# Patient Record
Sex: MALE | Race: WHITE | Employment: FULL TIME | URBAN - METROPOLITAN AREA
[De-identification: names, ages, dates, MRNs, and addresses within clinical notes are randomized per-mention and may not be internally consistent; named-entity substitution may affect disease eponyms.]

---

## 2020-03-02 ENCOUNTER — ED HISTORICAL/CONVERTED ENCOUNTER (OUTPATIENT)
Dept: OTHER | Age: 25
End: 2020-03-02

## 2021-03-31 ENCOUNTER — OFFICE VISIT (OUTPATIENT)
Dept: INTERNAL MEDICINE CLINIC | Age: 26
End: 2021-03-31
Payer: COMMERCIAL

## 2021-03-31 VITALS
RESPIRATION RATE: 20 BRPM | BODY MASS INDEX: 20.28 KG/M2 | OXYGEN SATURATION: 99 % | SYSTOLIC BLOOD PRESSURE: 109 MMHG | HEIGHT: 67 IN | TEMPERATURE: 97.1 F | DIASTOLIC BLOOD PRESSURE: 69 MMHG | WEIGHT: 129.2 LBS | HEART RATE: 79 BPM

## 2021-03-31 DIAGNOSIS — F10.90 ALCOHOL INTAKE ABOVE RECOMMENDED SENSIBLE LIMITS: ICD-10-CM

## 2021-03-31 DIAGNOSIS — F17.210 CIGARETTE NICOTINE DEPENDENCE WITHOUT COMPLICATION: ICD-10-CM

## 2021-03-31 DIAGNOSIS — G89.29 CHRONIC LEFT UPPER QUADRANT PAIN: Primary | ICD-10-CM

## 2021-03-31 DIAGNOSIS — R10.12 CHRONIC LEFT UPPER QUADRANT PAIN: Primary | ICD-10-CM

## 2021-03-31 DIAGNOSIS — N20.0 BILATERAL RENAL STONES: ICD-10-CM

## 2021-03-31 PROCEDURE — 99204 OFFICE O/P NEW MOD 45 MIN: CPT | Performed by: INTERNAL MEDICINE

## 2021-03-31 NOTE — LETTER
NOTIFICATION RETURN TO WORK  
 
3/31/2021 3:29 PM 
 
Mr. Alysha Dubois [address] [place] [address] [phone] To Whom It May Concern: 
 
Alysha Dubois is currently under the care of [address] [place] [address]. He will return to work on April 1, 2021. If there are questions or concerns please have the patient contact our office. Sincerely, Hanh Riojas MD

## 2021-03-31 NOTE — PATIENT INSTRUCTIONS

## 2021-03-31 NOTE — LETTER
3/31/2021 3:27 PM 
 
Mr. Steve Llamas [address] [facility] [address] [place] [address] [phone] Darrold Rides was in an appointment with me today Sincerely, Yanique Ziegler MD

## 2021-03-31 NOTE — PROGRESS NOTES
1. Have you been to the ER, urgent care clinic since your last visit? Hospitalized since your last visit? Yes When: 3 weeks ago Where: [facility] Reason for visit: Pain in rib cage    2. Have you seen or consulted any other health care providers outside of the [address] [place] [address] since your last visit? Include any pap smears or colon screening. No     Chief Complaint   Patient presents with    New Patient    Other     pain in rib cage     Pt states that he went to [facility] for pain in his rib cage. Has not seen a provider in years.

## 2021-03-31 NOTE — PROGRESS NOTES
Jaylen Diaz is a 31 y.o. male and presents with New Patient and Other (pain in rib cage)    He says he has been havin gn pain in eh rights side of his rib cage for years, but, he was having more pain 2 weeks ago, he went to [facility], was told no fractures, was ggiven medications, he got better after a week, started improving, he says now is coming back on and off, when he bends over it hurts. He says the pain is also in the left upper quadrant, this has been there for many years and he's very concerned about it. No diarrhea or constipaation, no blood in the stools, no unexpected weight loss, eating well. He went to [facility]. Trying to quit smoking, he's vaping nicotine. Review of Systems  Review of Systems   Constitutional: Negative for chills, fatigue, fever and unexpected weight change. HENT: Negative for congestion, ear pain, sneezing and sore throat. Eyes: Negative for pain and discharge. Respiratory: Negative for cough, shortness of breath and wheezing. Cardiovascular: Negative for chest pain, palpitations and leg swelling. Gastrointestinal: Negative for abdominal pain, blood in stool, constipation and diarrhea. Endocrine: Negative for polydipsia and polyuria. Genitourinary: Negative for difficulty urinating, dysuria, frequency, hematuria and urgency. Musculoskeletal: Negative for arthralgias, back pain and joint swelling. Skin: Negative for rash. Allergic/Immunologic: Negative for environmental allergies and food allergies. Neurological: Negative for dizziness, speech difficulty, weakness, light-headedness, numbness and headaches. Hematological: Negative for adenopathy. Psychiatric/Behavioral: Negative for behavioral problems (Depression), sleep disturbance and suicidal ideas. History reviewed. No pertinent past medical history.   Past Surgical History:   Procedure Laterality Date    HX ORTHOPAEDIC      MA CARDIAC SURG PROCEDURE UNLIST Social History     Socioeconomic History    Marital status: UNKNOWN     Spouse name: Not on file    Number of children: Not on file    Years of education: Not on file    Highest education level: Not on file   Tobacco Use    Smoking status: Current Every Day Smoker     Packs/day: 0.25     Years: 8.00     Pack years: 2.00     Types: Cigarettes    Smokeless tobacco: Never Used   Substance and Sexual Activity    Alcohol use: Yes     Alcohol/week: 6.0 standard drinks     Types: 6 Cans of beer per week     Frequency: 4 or more times a week     Drinks per session: 1 or 2     Binge frequency: Weekly    Drug use: Never    Sexual activity: Yes     Partners: Female     Birth control/protection: None     Family History   Problem Relation Age of Onset    Hypertension Mother     Depression Mother     Hypertension Father        No Known Allergies    Objective:  Visit Vitals  /69 (BP 1 Location: Left upper arm, BP Patient Position: Sitting, BP Cuff Size: Adult)   Pulse 79   Temp 97.1 °F (36.2 °C) (Oral)   Resp 20   Ht 5' 7\" (1.702 m)   Wt 129 lb 3.2 oz (58.6 kg)   SpO2 99% Comment: RA   BMI 20.24 kg/m²     Physical Exam:   Physical Exam  Constitutional:       General: He is not in acute distress. Appearance: Normal appearance. HENT:      Head: Normocephalic and atraumatic. Mouth/Throat:      Mouth: Mucous membranes are moist.   Eyes:      Extraocular Movements: Extraocular movements intact. Conjunctiva/sclera: Conjunctivae normal.      Pupils: Pupils are equal, round, and reactive to light. Neck:      Musculoskeletal: Normal range of motion and neck supple. Cardiovascular:      Rate and Rhythm: Normal rate and regular rhythm. Pulses: Normal pulses. Heart sounds: Normal heart sounds. Pulmonary:      Effort: Pulmonary effort is normal.      Breath sounds: Normal breath sounds. Abdominal:      General: Abdomen is flat. Bowel sounds are normal. There is no distension. Palpations: Abdomen is soft. There is no mass. Tenderness: There is no abdominal tenderness (Left upper quadrant underneath rib cage). Musculoskeletal:         General: No swelling or deformity. Right lower leg: No edema. Left lower leg: No edema. Lymphadenopathy:      Cervical: No cervical adenopathy. Skin:     General: Skin is warm and dry. Capillary Refill: Capillary refill takes less than 2 seconds. Coloration: Skin is not jaundiced or pale. Findings: No erythema or rash. Neurological:      General: No focal deficit present. Mental Status: He is alert and oriented to person, place, and time. Psychiatric:         Mood and Affect: Mood normal.         Behavior: Behavior normal.         Thought Content: Thought content normal.         Judgment: Judgment normal.          No results found for this or any previous visit. Assessment/Plan:    He has been having the pain described intermittently and described as severe by him many times rest, he had done recently as per what  he tells me is left rib series normal at [facility], will get records. He has left upper quadrant tenderness, I see anything else abnormal in the physical exam, given this is a chronic problem and no apparent reason has been found I will order a CT. Counseled about smoking cessation and decreasing amount of alcohol intake. ICD-10-CM ICD-9-CM    1. Chronic left upper quadrant pain  R10.12 789.02 CT ABD WO CONT    G89.29 338.29    2. Cigarette nicotine dependence without complication  Z88.790 006.6    3. Alcohol intake above recommended sensible limits  Z72.89 305.00      Orders Placed This Encounter    CT ABD WO CONT     Standing Status:   Future     Standing Expiration Date:   4/30/2022     Scheduling Instructions:      Patient wants to be scheduled at [address] [place] [address]     Order Specific Question:   Type of contrast.  PLEASE NOTE: IV contrast is NOT utilized with this order.      Answer:   Oral increase physical activity, limit alcohol consumption, stop smoking (advice and handout given), call if any problems  Patient Instructions        Stopping Smoking: Care Instructions  Your Care Instructions     Cigarette smokers crave the nicotine in cigarettes. Giving it up is much harder than simply changing a habit. Your body has to stop craving the nicotine. It is hard to quit, but you can do it. There are many tools that people use to quit smoking. You may find that combining tools works best for you. There are several steps to quitting. First you get ready to quit. Then you get support to help you. After that, you learn new skills and behaviors to become a nonsmoker. For many people, a necessary step is getting and using medicine. Your doctor will help you set up the plan that best meets your needs. You may want to attend a smoking cessation program to help you quit smoking. When you choose a program, look for one that has proven success. Ask your doctor for ideas. You will greatly increase your chances of success if you take medicine as well as get counseling or join a cessation program.  Some of the changes you feel when you first quit tobacco are uncomfortable. Your body will miss the nicotine at first, and you may feel short-tempered and grumpy. You may have trouble sleeping or concentrating. Medicine can help you deal with these symptoms. You may struggle with changing your smoking habits and rituals. The last step is the tricky one: Be prepared for the smoking urge to continue for a time. This is a lot to deal with, but keep at it. You will feel better. Follow-up care is a key part of your treatment and safety. Be sure to make and go to all appointments, and call your doctor if you are having problems. It's also a good idea to know your test results and keep a list of the medicines you take. How can you care for yourself at home? · Ask your family, friends, and coworkers for support.  You have a better chance of quitting if you have help and support. · Join a support group, such as Nicotine Anonymous, for people who are trying to quit smoking. · Consider signing up for a smoking cessation program, such as the American Lung Association's Freedom from Smoking program.  · Get text messaging support. Go to the website at www.smokefree. gov to sign up for the [facility] program.  · Set a quit date. Pick your date carefully so that it is not right in the middle of a big deadline or stressful time. Once you quit, do not even take a puff. Get rid of all ashtrays and lighters after your last cigarette. Clean your house and your clothes so that they do not smell of smoke. · Learn how to be a nonsmoker. Think about ways you can avoid those things that make you reach for a cigarette. ? Avoid situations that put you at greatest risk for smoking. For some people, it is hard to have a drink with friends without smoking. For others, they might skip a coffee break with coworkers who smoke. ? Change your daily routine. Take a different route to work or eat a meal in a different place. · Cut down on stress. Calm yourself or release tension by doing an activity you enjoy, such as reading a book, taking a hot bath, or gardening. · Talk to your doctor or pharmacist about nicotine replacement therapy, which replaces the nicotine in your body. You still get nicotine but you do not use tobacco. Nicotine replacement products help you slowly reduce the amount of nicotine you need. These products come in several forms, many of them available over-the-counter:  ? Nicotine patches  ? Nicotine gum and lozenges  ? Nicotine inhaler  · Ask your doctor about bupropion (Wellbutrin) or varenicline (Chantix), which are prescription medicines. They do not contain nicotine. They help you by reducing withdrawal symptoms, such as stress and anxiety.   · Some people find hypnosis, acupuncture, and massage helpful for ending the smoking habit.  · Eat a healthy diet and get regular exercise. Having healthy habits will help your body move past its craving for nicotine. · Be prepared to keep trying. Most people are not successful the first few times they try to quit. Do not get mad at yourself if you smoke again. Make a list of things you learned and think about when you want to try again, such as next week, next month, or next year. Where can you learn more? Go to http://www.gray.com/  Enter [id number] in the search box to learn more about \"Stopping Smoking: Care Instructions. \"  Current as of: March 12, 2020               Content Version: 12.6  © 0165-1489 [employer]. Care instructions adapted under license by [employer] (which disclaims liability or warranty for this information). If you have questions about a medical condition or this instruction, always ask your healthcare professional. [place] [address] any warranty or liability for your use of this information. Follow-up and Dispositions    · Return in about 2 months (around 5/31/2021).

## 2021-04-09 ENCOUNTER — HOSPITAL ENCOUNTER (OUTPATIENT)
Dept: CT IMAGING | Age: 26
Discharge: HOME | End: 2021-04-09
Payer: COMMERCIAL

## 2021-04-09 DIAGNOSIS — G89.29 CHRONIC LEFT UPPER QUADRANT PAIN: ICD-10-CM

## 2021-04-09 DIAGNOSIS — R10.12 CHRONIC LEFT UPPER QUADRANT PAIN: ICD-10-CM

## 2021-04-09 PROCEDURE — 74150 CT ABDOMEN W/O CONTRAST: CPT

## 2021-04-14 NOTE — PROGRESS NOTES
Shahriar can you please email him the Urology referral I am placing? Thanks. I sent him the following msg to his portal:  Nagi Peraza, you have a right small kidney stone and a left one as well, they are not causing obstruction which is a good thing and are very very small, only 2 mm. I am not sure this is the actual reason for your pain but it could be. Since they are proximal it's unlikely that they will be eliminated spontaneously, so I am going to refer you to Urology to see if we can manage medically or if you need any intervention. You should be able to see the referral in your portal, just in case we will email it to you as well. Thank you.

## 2021-04-16 ENCOUNTER — TELEPHONE (OUTPATIENT)
Dept: INTERNAL MEDICINE CLINIC | Age: 26
End: 2021-04-16

## 2021-04-16 NOTE — TELEPHONE ENCOUNTER
Patient called and left message, received results of CT on portal and wanted to discuss. Called patient, no answer. Voicemail box full, unable to leave message.

## 2021-05-04 ENCOUNTER — TELEPHONE (OUTPATIENT)
Dept: UROLOGY | Age: 26
End: 2021-05-04

## 2021-05-04 NOTE — TELEPHONE ENCOUNTER
JOHN for patients asking to call the office in reference to his previous message that he left about making an appointment for kidney stones and he was referred by his  PCP  Dr. Nuno Lujan

## 2021-06-14 ENCOUNTER — OFFICE VISIT (OUTPATIENT)
Dept: INTERNAL MEDICINE CLINIC | Age: 26
End: 2021-06-14
Payer: COMMERCIAL

## 2021-06-14 VITALS
BODY MASS INDEX: 20.09 KG/M2 | OXYGEN SATURATION: 97 % | SYSTOLIC BLOOD PRESSURE: 105 MMHG | TEMPERATURE: 98.5 F | DIASTOLIC BLOOD PRESSURE: 67 MMHG | HEIGHT: 67 IN | HEART RATE: 79 BPM | WEIGHT: 128 LBS | RESPIRATION RATE: 16 BRPM

## 2021-06-14 DIAGNOSIS — R10.12 CHRONIC LEFT UPPER QUADRANT PAIN: ICD-10-CM

## 2021-06-14 DIAGNOSIS — R10.9 CHRONIC LEFT FLANK PAIN: ICD-10-CM

## 2021-06-14 DIAGNOSIS — G89.29 CHRONIC LEFT FLANK PAIN: ICD-10-CM

## 2021-06-14 DIAGNOSIS — G89.29 CHRONIC LEFT UPPER QUADRANT PAIN: ICD-10-CM

## 2021-06-14 DIAGNOSIS — Z11.59 NEED FOR HEPATITIS C SCREENING TEST: ICD-10-CM

## 2021-06-14 DIAGNOSIS — N20.0 BILATERAL RENAL STONES: Primary | ICD-10-CM

## 2021-06-14 PROCEDURE — 99214 OFFICE O/P EST MOD 30 MIN: CPT | Performed by: INTERNAL MEDICINE

## 2021-06-14 RX ORDER — NAPROXEN 375 MG/1
375 TABLET ORAL
Qty: 30 TABLET | Refills: 0 | Status: SHIPPED | OUTPATIENT
Start: 2021-06-14

## 2021-06-14 NOTE — PROGRESS NOTES
Chief Complaint   Patient presents with    Follow-up    Kidney Stone     Pt states he has an appt in August to see urologist. Reports pain comes and goes, mainly in the morning. Visit Vitals  /67 (BP 1 Location: Left arm, BP Patient Position: Sitting)   Pulse 79   Temp 98.5 °F (36.9 °C) (Oral)   Resp 16   Ht 5' 7\" (1.702 m)   Wt 128 lb (58.1 kg)   SpO2 97%   BMI 20.05 kg/m²     1. Have you been to the ER, urgent care clinic since your last visit? Hospitalized since your last visit? No    2. Have you seen or consulted any other health care providers outside of the [address] [place] [address] since your last visit? Include any pap smears or colon screening.  No

## 2021-06-14 NOTE — PROGRESS NOTES
Gaetano Thurman is a 31 y.o. male and presents with Follow-up and Kidney Stone (Pt states he has an appt in August to see urologist. Reports pain comes and goes, mainly in the morning. )    Still having intermittent left upper quadrant pain and left flank pain, some days is mild, some other days is really bad around 9-10/10, some days he does not have pain. Pain is sharp, colicky, it stays for few minutes. He does not try to take anything when it's bad because he knows it will go away on it's own. CT abdomen done in April as mentioned before shows 2 kidney stones 1 in the right and one in the left both of 2 mm. The one in the left is in the lower pole of the kidney    Review of Systems  Review of Systems   Constitutional: Negative for chills, fatigue, fever and unexpected weight change. HENT: Negative for congestion, ear pain, sneezing and sore throat. Eyes: Negative for pain and discharge. Respiratory: Negative for cough, shortness of breath and wheezing. Cardiovascular: Negative for chest pain, palpitations and leg swelling. Gastrointestinal: Positive for abdominal pain. Negative for blood in stool, constipation and diarrhea. Endocrine: Negative for polydipsia and polyuria. Genitourinary: Negative for difficulty urinating, dysuria, frequency, hematuria and urgency. Musculoskeletal: Negative for arthralgias, back pain and joint swelling. Skin: Negative for rash. Allergic/Immunologic: Negative for environmental allergies and food allergies. Neurological: Negative for dizziness, speech difficulty, weakness, light-headedness, numbness and headaches. Hematological: Negative for adenopathy. Psychiatric/Behavioral: Negative for behavioral problems (Depression), sleep disturbance and suicidal ideas. History reviewed. No pertinent past medical history.   Past Surgical History:   Procedure Laterality Date    HX ORTHOPAEDIC      [place] CARDIAC SURG PROCEDURE UNLIST       Social History Socioeconomic History    Marital status: SINGLE     Spouse name: Not on file    Number of children: Not on file    Years of education: Not on file    Highest education level: Not on file   Tobacco Use    Smoking status: Former Smoker     Packs/day: 0.25     Years: 8.00     Pack years: 2.00     Types: Cigarettes     Quit date: 2021     Years since quittin.0    Smokeless tobacco: Never Used   Vaping Use    Vaping Use: Every day    Substances: Nicotine, Flavoring    Devices: Refillable tank   Substance and Sexual Activity    Alcohol use: Yes     Alcohol/week: 6.0 standard drinks     Types: 6 Cans of beer per week    Drug use: Never    Sexual activity: Yes     Partners: Female     Birth control/protection: None     Social Determinants of Health     Financial Resource Strain:     Difficulty of Paying Living Expenses:    Food Insecurity:     Worried About Running Out of Food in the Last Year:     [address] in the Last Year:    Transportation Needs:     Lack of Transportation (Medical):      Lack of Transportation (Non-Medical):    Physical Activity:     Days of Exercise per Week:     Minutes of Exercise per Session:    Stress:     Feeling of Stress :    Social Connections:     Frequency of Communication with Friends and Family:     Frequency of Social Gatherings with Friends and Family:     Attends [employer] Services:     Active Member of Clubs or Organizations:     Attends Club or Organization Meetings:     Marital Status:      Family History   Problem Relation Age of Onset    Hypertension Mother     Depression Mother     Hypertension Father        No Known Allergies    Objective:  Visit Vitals  /67 (BP 1 Location: Left arm, BP Patient Position: Sitting)   Pulse 79   Temp 98.5 °F (36.9 °C) (Oral)   Resp 16   Ht 5' 7\" (1.702 m)   Wt 128 lb (58.1 kg)   SpO2 97%   BMI 20.05 kg/m²     Physical Exam:   Physical Exam  Constitutional:       General: He is not in acute distress. Appearance: Normal appearance. HENT:      Head: Normocephalic and atraumatic. Mouth/Throat:      Mouth: Mucous membranes are moist.   Eyes:      Extraocular Movements: Extraocular movements intact. Conjunctiva/sclera: Conjunctivae normal.      Pupils: Pupils are equal, round, and reactive to light. Cardiovascular:      Rate and Rhythm: Normal rate and regular rhythm. Pulses: Normal pulses. Heart sounds: Normal heart sounds. Pulmonary:      Effort: Pulmonary effort is normal.      Breath sounds: Normal breath sounds. Abdominal:      General: Abdomen is flat. Bowel sounds are normal. There is no distension. Palpations: Abdomen is soft. There is no mass. Tenderness: There is no abdominal tenderness. Musculoskeletal:         General: No swelling or deformity. Cervical back: Normal range of motion and neck supple. Right lower leg: No edema. Left lower leg: No edema. Lymphadenopathy:      Cervical: No cervical adenopathy. Skin:     General: Skin is warm and dry. Capillary Refill: Capillary refill takes less than 2 seconds. Coloration: Skin is not jaundiced or pale. Findings: No erythema or rash. Neurological:      General: No focal deficit present. Mental Status: He is alert and oriented to person, place, and time. Psychiatric:         Mood and Affect: Mood normal.         Behavior: Behavior normal.         Thought Content: Thought content normal.         Judgment: Judgment normal.          No results found for this or any previous visit. Assessment/Plan:    I do not see any other apparent reason for the pain he is having asides from the small kidney stone in the left lower pole, he has appointment scheduled with urology Dr. Puja Dutton in August, [facility] maybe can be [facility].   Can take NSAIDs as needed for pain      ICD-10-CM ICD-9-CM    1. Bilateral renal stones  N20.0 592.0 REFERRAL TO UROLOGY      naproxen (NAPROSYN) 375 mg tablet      CBC WITH AUTOMATED DIFF      METABOLIC PANEL, COMPREHENSIVE   2. Chronic left flank pain  R10.9 789.09 REFERRAL TO UROLOGY    G89.29 338.29 naproxen (NAPROSYN) 375 mg tablet      CBC WITH AUTOMATED DIFF      METABOLIC PANEL, COMPREHENSIVE   3. Chronic left upper quadrant pain  R10.12 789.02 REFERRAL TO UROLOGY    G89.29 338.29 naproxen (NAPROSYN) 375 mg tablet      CBC WITH AUTOMATED DIFF      METABOLIC PANEL, COMPREHENSIVE   4. Need for hepatitis C screening test  Z11.59 V73.89 HEPATITIS C AB     Orders Placed This Encounter    CBC WITH AUTOMATED DIFF    METABOLIC PANEL, COMPREHENSIVE    HEPATITIS C AB    REFERRAL TO UROLOGY     Referral Priority:   Routine     Referral Type:   Consultation     Referral Reason:   Specialty Services Required     Referred to Provider:   Mata Jon MD     Number of Visits Requested:   1    naproxen (NAPROSYN) 375 mg tablet     Sig: Take 1 Tablet by mouth two (2) times daily as needed for Pain. Dispense:  30 Tablet     Refill:  0     call if any problems  There are no Patient Instructions on file for this visit. Follow-up and Dispositions    · Return in about 3 months (around 9/14/2021).

## 2021-06-14 NOTE — LETTER
6/14/2021 4:41 PM 
 
Mr. Ailyn Rajan [address] [facility] [address] [place] [address] [phone] To whom it may concern, 
 
Jg Willis was at my office for his 4 pm appointment today. Sincerely, Samuel Duron MD

## 2021-06-26 LAB
ALBUMIN SERPL-MCNC: 4.8 G/DL (ref 4.1–5.2)
ALBUMIN/GLOB SERPL: 1.9 {RATIO} (ref 1.2–2.2)
ALP SERPL-CCNC: 52 IU/L (ref 48–121)
ALT SERPL-CCNC: 8 IU/L (ref 0–44)
AST SERPL-CCNC: 13 IU/L (ref 0–40)
BASOPHILS # BLD AUTO: 0 X10E3/UL (ref 0–0.2)
BASOPHILS NFR BLD AUTO: 1 %
BILIRUB SERPL-MCNC: 1.3 MG/DL (ref 0–1.2)
BUN SERPL-MCNC: 18 MG/DL (ref 6–20)
BUN/CREAT SERPL: 17 (ref 9–20)
CALCIUM SERPL-MCNC: 9.9 MG/DL (ref 8.7–10.2)
CHLORIDE SERPL-SCNC: 106 MMOL/L (ref 96–106)
CO2 SERPL-SCNC: 25 MMOL/L (ref 20–29)
CREAT SERPL-MCNC: 1.05 MG/DL (ref 0.76–1.27)
EOSINOPHIL # BLD AUTO: 0.1 X10E3/UL (ref 0–0.4)
EOSINOPHIL NFR BLD AUTO: 2 %
ERYTHROCYTE [DISTWIDTH] IN BLOOD BY AUTOMATED COUNT: 12.4 % (ref 11.6–15.4)
GLOBULIN SER CALC-MCNC: 2.5 G/DL (ref 1.5–4.5)
GLUCOSE SERPL-MCNC: 101 MG/DL (ref 65–99)
HCT VFR BLD AUTO: 45.3 % (ref 37.5–51)
HCV AB S/CO SERPL IA: <0.1 S/CO RATIO (ref 0–0.9)
HGB BLD-MCNC: 15.2 G/DL (ref 13–17.7)
IMM GRANULOCYTES # BLD AUTO: 0 X10E3/UL (ref 0–0.1)
IMM GRANULOCYTES NFR BLD AUTO: 0 %
LYMPHOCYTES # BLD AUTO: 2 X10E3/UL (ref 0.7–3.1)
LYMPHOCYTES NFR BLD AUTO: 36 %
MCH RBC QN AUTO: 30 PG (ref 26.6–33)
MCHC RBC AUTO-ENTMCNC: 33.6 G/DL (ref 31.5–35.7)
MCV RBC AUTO: 89 FL (ref 79–97)
MONOCYTES # BLD AUTO: 0.4 X10E3/UL (ref 0.1–0.9)
MONOCYTES NFR BLD AUTO: 7 %
NEUTROPHILS # BLD AUTO: 2.9 X10E3/UL (ref 1.4–7)
NEUTROPHILS NFR BLD AUTO: 54 %
PLATELET # BLD AUTO: 304 X10E3/UL (ref 150–450)
POTASSIUM SERPL-SCNC: 4.7 MMOL/L (ref 3.5–5.2)
PROT SERPL-MCNC: 7.3 G/DL (ref 6–8.5)
RBC # BLD AUTO: 5.07 X10E6/UL (ref 4.14–5.8)
SODIUM SERPL-SCNC: 144 MMOL/L (ref 134–144)
WBC # BLD AUTO: 5.4 X10E3/UL (ref 3.4–10.8)

## 2021-08-12 PROBLEM — N20.0 KIDNEY STONE: Status: ACTIVE | Noted: 2021-08-12

## 2021-08-13 ENCOUNTER — OFFICE VISIT (OUTPATIENT)
Dept: UROLOGY | Age: 26
End: 2021-08-13
Payer: COMMERCIAL

## 2021-08-13 VITALS
HEIGHT: 67 IN | TEMPERATURE: 97.1 F | BODY MASS INDEX: 19.62 KG/M2 | OXYGEN SATURATION: 98 % | SYSTOLIC BLOOD PRESSURE: 105 MMHG | DIASTOLIC BLOOD PRESSURE: 67 MMHG | HEART RATE: 78 BPM | WEIGHT: 125 LBS | RESPIRATION RATE: 22 BRPM

## 2021-08-13 DIAGNOSIS — N20.0 KIDNEY STONE: Primary | ICD-10-CM

## 2021-08-13 DIAGNOSIS — R10.9 FLANK PAIN: ICD-10-CM

## 2021-08-13 LAB
BILIRUB UR QL STRIP: NEGATIVE
GLUCOSE UR-MCNC: NEGATIVE MG/DL
KETONES P FAST UR STRIP-MCNC: NEGATIVE MG/DL
PH UR STRIP: 5 [PH] (ref 4.6–8)
PROT UR QL STRIP: NEGATIVE
SP GR UR STRIP: 1.03 (ref 1–1.03)
UA UROBILINOGEN AMB POC: NORMAL (ref 0.2–1)
URINALYSIS CLARITY POC: CLEAR
URINALYSIS COLOR POC: YELLOW
URINE BLOOD POC: NEGATIVE
URINE LEUKOCYTES POC: NEGATIVE
URINE NITRITES POC: NEGATIVE

## 2021-08-13 PROCEDURE — 99203 OFFICE O/P NEW LOW 30 MIN: CPT | Performed by: UROLOGY

## 2021-08-13 PROCEDURE — 81003 URINALYSIS AUTO W/O SCOPE: CPT | Performed by: UROLOGY

## 2021-08-13 NOTE — LETTER
8/13/2021    Patient: Noemi Shelton   YOB: 1995   Date of Visit: 8/13/2021     Prakash Bright MD  [address] [place] [address]  [facility] [facility] [phone]    Dear Prakash Bright MD,      Thank you for referring Mr. Dona Valle to [place] [address] for evaluation. My notes for this consultation are attached. If you have questions, please do not hesitate to call me. I look forward to following your patient along with you.       Sincerely,    Sherita Crawley MD

## 2021-08-13 NOTE — ASSESSMENT & PLAN NOTE
Punctate kidney stones, not symptomatic. I agree he does not need intervention for these. Advised to hydrate well. He has established care with me and may follow-up as needed.

## 2021-08-13 NOTE — PROGRESS NOTES
HISTORY OF PRESENT ILLNESS  Merlin Sayers is a 31 y.o. male. Chief Complaint   Patient presents with    New Patient    Kidney Yeimy Young     He has seen that there was in a few months ago with bilateral back pains. He was found to have stones bilaterally. There is no hydronephrosis. CT images were reviewed by me from 4/9/2021 and a agree. He had seen within urology last month, apparently because he could not get in to see me earlier. He had a repeat CT scan with them which confirmed small stones. He was told he did not need intervention. His pain has resolved. He feels fine. Chronic Conditions Addressed Today     1. Kidney stone     Overview      4/9/21: CT scan shows bilateral nonobstructive renal calculi up to 2 mm. Patient denies the symptoms of COVID-19 per routine screening guidelines. Review of Systems   All other systems reviewed and are negative. Past Medical History:  PMHx (including negatives):  has a past medical history of Calculus of kidney. PSurgHx:  has a past surgical history that includes hx orthopaedic and pr cardiac surg procedure unlist.  PSocHx:  reports that he quit smoking about 2 months ago. His smoking use included cigarettes. He has a 2.00 pack-year smoking history. He has never used smokeless tobacco. He reports current alcohol use of about 6.0 standard drinks of alcohol per week. He reports that he does not use drugs. Physical Exam  Constitutional:       Appearance: Normal appearance. HENT:      Head: Normocephalic and atraumatic. Nose: Nose normal.   Eyes:      Extraocular Movements: Extraocular movements intact. Conjunctiva/sclera: Conjunctivae normal.   Pulmonary:      Effort: Pulmonary effort is normal.   Neurological:      Mental Status: He is alert and oriented to person, place, and time.    Psychiatric:         Mood and Affect: Mood normal.         Behavior: Behavior normal.         ASSESSMENT and PLAN  Diagnoses and all orders for this visit:    1. Kidney stone  Assessment & Plan:   Punctate kidney stones, not symptomatic. I agree he does not need intervention for these. Advised to hydrate well. He has established care with me and may follow-up as needed. 2. Flank pain  Assessment & Plan:   Resolved left-sided pains. Likely musculoskeletal and no urologic explanation for this.       Other orders  -     AMB POC URINALYSIS DIP STICK AUTO W/O MICRO             Thalia Miller MD

## 2021-08-13 NOTE — LETTER
NOTIFICATION RETURN TO WORK / SCHOOL    8/13/2021 11:13 AM    Mr. Telma Goldman [id number]      To Whom It May Concern:    Cornelius Lopez was seen in my office on 8/13/2021     He will have the following limitations: May return to work/school on:  08/16/2021  Work limitations: N/A    If there are questions or concerns please have the patient contact our office.         Sincerely,      Jeanine Merritt 2   [phone]

## 2021-08-13 NOTE — PROGRESS NOTES
1. Have you been to the ER, urgent care clinic since your last visit? Hospitalized since your last visit? No    2. Have you seen or consulted any other health care providers outside of the [address] [place] [address] since your last visit? Include any pap smears or colon screening.  Urologist in [facility]  Chief Complaint   Patient presents with   [facility] Patient    Kidney Stone     Visit Vitals  /67 (BP 1 Location: Right arm, BP Patient Position: Sitting, BP Cuff Size: Adult)   Pulse 78   Temp 97.1 °F (36.2 °C) (Temporal)   Resp 22   Ht 5' 7\" (1.702 m)   Wt 125 lb (56.7 kg)   SpO2 98%   BMI 19.58 kg/m²

## 2022-03-18 PROBLEM — N20.0 KIDNEY STONE: Status: ACTIVE | Noted: 2021-08-12

## 2022-03-19 PROBLEM — R10.9 FLANK PAIN: Status: ACTIVE | Noted: 2021-08-13

## 2022-12-15 ENCOUNTER — OFFICE VISIT (OUTPATIENT)
Dept: INTERNAL MEDICINE CLINIC | Age: 27
End: 2022-12-15
Payer: COMMERCIAL

## 2022-12-15 ENCOUNTER — TRANSCRIBE ORDER (OUTPATIENT)
Dept: EMERGENCY DEPT | Age: 27
End: 2022-12-15

## 2022-12-15 ENCOUNTER — HOSPITAL ENCOUNTER (OUTPATIENT)
Dept: LAB | Age: 27
End: 2022-12-15
Payer: COMMERCIAL

## 2022-12-15 ENCOUNTER — HOSPITAL ENCOUNTER (OUTPATIENT)
Dept: GENERAL RADIOLOGY | Age: 27
Discharge: HOME | End: 2022-12-15
Payer: COMMERCIAL

## 2022-12-15 VITALS
DIASTOLIC BLOOD PRESSURE: 72 MMHG | RESPIRATION RATE: 18 BRPM | OXYGEN SATURATION: 100 % | HEART RATE: 80 BPM | WEIGHT: 126 LBS | SYSTOLIC BLOOD PRESSURE: 107 MMHG | BODY MASS INDEX: 19.78 KG/M2 | HEIGHT: 67 IN | TEMPERATURE: 97.7 F

## 2022-12-15 DIAGNOSIS — Z87.442 HISTORY OF KIDNEY STONES: ICD-10-CM

## 2022-12-15 DIAGNOSIS — Z00.00 ANNUAL PHYSICAL EXAM: ICD-10-CM

## 2022-12-15 DIAGNOSIS — G89.29 CHRONIC RIGHT-SIDED LOW BACK PAIN, UNSPECIFIED WHETHER SCIATICA PRESENT: Primary | ICD-10-CM

## 2022-12-15 DIAGNOSIS — M54.50 CHRONIC RIGHT-SIDED LOW BACK PAIN, UNSPECIFIED WHETHER SCIATICA PRESENT: Primary | ICD-10-CM

## 2022-12-15 LAB
ALBUMIN SERPL-MCNC: 4.1 G/DL (ref 3.5–5)
ALBUMIN/GLOB SERPL: 1.1 {RATIO} (ref 1.1–2.2)
ALP SERPL-CCNC: 58 U/L (ref 45–117)
ALT SERPL-CCNC: 20 U/L (ref 12–78)
ANION GAP SERPL CALC-SCNC: 3 MMOL/L (ref 5–15)
APPEARANCE UR: CLEAR
AST SERPL W P-5'-P-CCNC: 15 U/L (ref 15–37)
BACTERIA URNS QL MICRO: NEGATIVE /HPF
BILIRUB SERPL-MCNC: 1 MG/DL (ref 0.2–1)
BILIRUB UR QL: NEGATIVE
BUN SERPL-MCNC: 18 MG/DL (ref 6–20)
BUN/CREAT SERPL: 20 (ref 12–20)
CA-I BLD-MCNC: 9.5 MG/DL (ref 8.5–10.1)
CHLORIDE SERPL-SCNC: 107 MMOL/L (ref 97–108)
CO2 SERPL-SCNC: 28 MMOL/L (ref 21–32)
COLOR UR: ABNORMAL
CREAT SERPL-MCNC: 0.92 MG/DL (ref 0.7–1.3)
ERYTHROCYTE [DISTWIDTH] IN BLOOD BY AUTOMATED COUNT: 12.4 % (ref 11.5–14.5)
GLOBULIN SER CALC-MCNC: 3.8 G/DL (ref 2–4)
GLUCOSE SERPL-MCNC: 85 MG/DL (ref 65–100)
GLUCOSE UR STRIP.AUTO-MCNC: NEGATIVE MG/DL
HCT VFR BLD AUTO: 44.3 % (ref 36.6–50.3)
HGB BLD-MCNC: 14.7 G/DL (ref 12.1–17)
HGB UR QL STRIP: NEGATIVE
KETONES UR QL STRIP.AUTO: NEGATIVE MG/DL
LEUKOCYTE ESTERASE UR QL STRIP.AUTO: NEGATIVE
MCH RBC QN AUTO: 30 PG (ref 26–34)
MCHC RBC AUTO-ENTMCNC: 33.2 G/DL (ref 30–36.5)
MCV RBC AUTO: 90.4 FL (ref 80–99)
NITRITE UR QL STRIP.AUTO: NEGATIVE
NRBC # BLD: 0 K/UL (ref 0–0.01)
NRBC BLD-RTO: 0 PER 100 WBC
PH UR STRIP: 7 [PH]
PLATELET # BLD AUTO: 336 K/UL (ref 150–400)
PMV BLD AUTO: 9.2 FL (ref 8.9–12.9)
POTASSIUM SERPL-SCNC: 4.5 MMOL/L (ref 3.5–5.1)
PROT SERPL-MCNC: 7.9 G/DL (ref 6.4–8.2)
PROT UR STRIP-MCNC: NEGATIVE MG/DL
RBC # BLD AUTO: 4.9 M/UL (ref 4.1–5.7)
RBC #/AREA URNS HPF: ABNORMAL /HPF (ref 0–5)
SODIUM SERPL-SCNC: 138 MMOL/L (ref 136–145)
SP GR UR REFRACTOMETRY: 1.01 (ref 1–1.03)
UROBILINOGEN UR QL STRIP.AUTO: 0.1 EU/DL (ref 0.2–1)
WBC # BLD AUTO: 6.3 K/UL (ref 4.1–11.1)
WBC URNS QL MICRO: ABNORMAL /HPF (ref 0–4)

## 2022-12-15 PROCEDURE — 74018 RADEX ABDOMEN 1 VIEW: CPT

## 2022-12-15 PROCEDURE — 36415 COLL VENOUS BLD VENIPUNCTURE: CPT

## 2022-12-15 PROCEDURE — 85027 COMPLETE CBC AUTOMATED: CPT

## 2022-12-15 PROCEDURE — 81003 URINALYSIS AUTO W/O SCOPE: CPT

## 2022-12-15 PROCEDURE — 80053 COMPREHEN METABOLIC PANEL: CPT

## 2022-12-15 RX ORDER — DICLOFENAC SODIUM 75 MG/1
TABLET, DELAYED RELEASE ORAL
COMMUNITY
End: 2022-12-15

## 2022-12-15 RX ORDER — CYCLOBENZAPRINE HCL 5 MG
5 TABLET ORAL
Qty: 21 TABLET | Refills: 0 | Status: SHIPPED | OUTPATIENT
Start: 2022-12-15

## 2022-12-15 RX ORDER — DICLOFENAC SODIUM 10 MG/G
GEL TOPICAL
Qty: 1 EACH | Refills: 1 | Status: SHIPPED | OUTPATIENT
Start: 2022-12-15

## 2022-12-15 RX ORDER — ACETAMINOPHEN 500 MG
500 TABLET ORAL
COMMUNITY

## 2022-12-15 RX ORDER — KETOROLAC TROMETHAMINE 10 MG/1
TABLET, FILM COATED ORAL
COMMUNITY
Start: 2022-10-21

## 2022-12-15 NOTE — PROGRESS NOTES
1. Have you been to the ER, urgent care clinic since your last visit? Hospitalized since your last visit? Yes- vasectomy done at [facility] in [place] on 10/21/22.vs    2. Have you seen or consulted any other health care providers outside of the [address] [place] [address] since your last visit? Include any pap smears or colon screening.  Yes- [facility]  Chief Complaint   Patient presents with    Back Pain     3 months     Visit Vitals  /72 (BP 1 Location: Left upper arm, BP Patient Position: Sitting, BP Cuff Size: Adult)   Pulse 80   Temp 97.7 °F (36.5 °C) (Oral)   Resp 18   Ht 5' 7\" (1.702 m)   Wt 126 lb (57.2 kg)   SpO2 100%   BMI 19.73 kg/m²

## 2022-12-15 NOTE — LETTER
NOTIFICATION RETURN TO WORK / SCHOOL    12/15/2022 8:52 AM    Mr. Champ Childs [id number]      To Whom It May Concern:    Nirav Gallego is currently under the care of [address] [place] [address]. He c/o back pain, recommend light work until 1/2/24. He had a clinic visit today , 12/15/22. He will return to work tomorrow, 12/16/22. If there are questions or concerns please have the patient contact our office.         Sincerely,      Jonah Marcano MD

## 2022-12-15 NOTE — PROGRESS NOTES
Prabhakar Romero (: 1995) is a 28 y.o. male, established patient, here for evaluation of the following chief complaint(s):  Back Pain (3 months)       ASSESSMENT/PLAN:  Below is the assessment and plan developed based on review of pertinent history, physical exam, labs, studies, and medications. 1. Chronic right-sided low back pain, unspecified whether sciatica present  -     diclofenac (VOLTAREN) 1 % gel; Apply  to affected area four (4) times daily as needed for Pain., Normal, Disp-1 Each, R-1  -     cyclobenzaprine (FLEXERIL) 5 mg tablet; Take 1 Tablet by mouth three (3) times daily as needed for Muscle Spasm(s). , Normal, Disp-21 Tablet, R-0  2. History of kidney stones  -     XR ABD (KUB); Future  -     URINALYSIS W/ RFLX MICROSCOPIC  3. Annual physical exam  -     CBC W/O DIFF  -     METABOLIC PANEL, COMPREHENSIVE    Back pain: Likely secondary to muscular strain. He is a  and does lift heavy weights. Prescribed diclofenac gel as needed as well as Flexeril  He also has a history of kidney stones, KUB and UA ordered    If pain continues to persist or worsen he will need further imaging and referral to Ortho. Blood work ordered  [facility] Flu shot     Return in about 1 year (around 12/15/2023) for follow up. SUBJECTIVE/OBJECTIVE:  YUMIKO Romero is a 26-year-old who presents to the clinic for a follow up and c/o back pain. He complains of chronic back pain which has been going on for couple months, but states his back pain recently worsened a couple of days ago. 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  He states he usually has back pain, on and off but since last couple days its been severe. No complaints of weakness of extremities, but states his pain is going down to his right leg. No fever, weight loss  He has a previous history of kidney stones. No increased frequency, hematuria.      He had a recent vasectomy. No Known Allergies  Current Outpatient Medications   Medication Sig    ketorolac (TORADOL) 10 mg tablet     acetaminophen (TYLENOL) 500 mg tablet Take 500 mg by mouth every six (6) hours as needed for Pain. diclofenac (VOLTAREN) 1 % gel Apply  to affected area four (4) times daily as needed for Pain. cyclobenzaprine (FLEXERIL) 5 mg tablet Take 1 Tablet by mouth three (3) times daily as needed for Muscle Spasm(s). No current facility-administered medications for this visit. 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 HENT:  Negative for congestion, hearing loss and sore throat. Eyes: Negative. Respiratory:  Negative for cough and shortness of breath. Cardiovascular:  Negative for chest pain, palpitations and leg swelling. Gastrointestinal:  Negative for abdominal pain, nausea and vomiting. Genitourinary: Negative. Musculoskeletal:  Positive for back pain. Skin: Negative. Neurological:  Negative for focal weakness, loss of consciousness and headaches. Psychiatric/Behavioral: Negative.          Vitals:    12/15/22 0825   BP: 107/72   Pulse: 80   Resp: 18   Temp: 97.7 °F (36.5 °C)   TempSrc: Oral   SpO2: 100%   Weight: 126 lb (57.2 kg)   Height: 5' 7\" (1.702 m)      Physical Exam  Constitutional:       General: He is not in acute distress. Appearance: Normal appearance. HENT:      Head: Normocephalic. Nose: Nose normal.      Mouth/Throat:      Mouth: Mucous membranes are moist.   Eyes:      Extraocular Movements: Extraocular movements intact. Conjunctiva/sclera: Conjunctivae normal.      Pupils: Pupils are equal, round, and reactive to light. Cardiovascular:      Rate and Rhythm: Normal rate and regular rhythm. Heart sounds: Normal heart sounds. Pulmonary:      Effort: Pulmonary effort is normal.      Breath sounds: Normal breath sounds. Abdominal:      General: Bowel sounds are normal. There is no distension. Palpations: Abdomen is soft. Tenderness: There is no abdominal tenderness. Musculoskeletal:      Cervical back: Normal range of motion and neck supple. Comments: Right lower lumbar paraspinal tenderness  Strength lower extremity- 5/5    Neurological:      General: No focal deficit present. Mental Status: He is alert. Mental status is at baseline. Motor: No weakness. Psychiatric:         Mood and Affect: Mood normal.         Behavior: Behavior normal.   Possible please confirm with all      An electronic signature was used to authenticate this note.   -- Kip Dorado MD

## 2022-12-22 ENCOUNTER — TELEPHONE (OUTPATIENT)
Dept: INTERNAL MEDICINE CLINIC | Age: 27
End: 2022-12-22

## 2022-12-22 NOTE — TELEPHONE ENCOUNTER
Pt called about xray results. Results giving and advised also suggested he us benefiber. To try for about 1 wk and see if it helps.

## 2024-03-12 SDOH — HEALTH STABILITY: PHYSICAL HEALTH
ON AVERAGE, HOW MANY DAYS PER WEEK DO YOU ENGAGE IN MODERATE TO STRENUOUS EXERCISE (LIKE A BRISK WALK)?: PATIENT DECLINED

## 2024-03-15 ENCOUNTER — OFFICE VISIT (OUTPATIENT)
Facility: CLINIC | Age: 29
End: 2024-03-15
Payer: COMMERCIAL

## 2024-03-15 VITALS
BODY MASS INDEX: 19.15 KG/M2 | DIASTOLIC BLOOD PRESSURE: 74 MMHG | HEIGHT: 67 IN | WEIGHT: 122 LBS | TEMPERATURE: 98 F | RESPIRATION RATE: 18 BRPM | SYSTOLIC BLOOD PRESSURE: 110 MMHG | HEART RATE: 88 BPM | OXYGEN SATURATION: 100 %

## 2024-03-15 DIAGNOSIS — Z11.4 ENCOUNTER FOR SCREENING FOR HIV: ICD-10-CM

## 2024-03-15 DIAGNOSIS — Z11.3 SCREEN FOR STD (SEXUALLY TRANSMITTED DISEASE): ICD-10-CM

## 2024-03-15 DIAGNOSIS — Z00.00 ENCOUNTER FOR WELLNESS EXAMINATION IN ADULT: Primary | ICD-10-CM

## 2024-03-15 DIAGNOSIS — Z00.00 ENCOUNTER FOR WELLNESS EXAMINATION IN ADULT: ICD-10-CM

## 2024-03-15 PROCEDURE — 99395 PREV VISIT EST AGE 18-39: CPT

## 2024-03-15 SDOH — ECONOMIC STABILITY: INCOME INSECURITY: HOW HARD IS IT FOR YOU TO PAY FOR THE VERY BASICS LIKE FOOD, HOUSING, MEDICAL CARE, AND HEATING?: NOT HARD AT ALL

## 2024-03-15 SDOH — ECONOMIC STABILITY: HOUSING INSECURITY
IN THE LAST 12 MONTHS, WAS THERE A TIME WHEN YOU DID NOT HAVE A STEADY PLACE TO SLEEP OR SLEPT IN A SHELTER (INCLUDING NOW)?: NO

## 2024-03-15 SDOH — ECONOMIC STABILITY: FOOD INSECURITY: WITHIN THE PAST 12 MONTHS, YOU WORRIED THAT YOUR FOOD WOULD RUN OUT BEFORE YOU GOT MONEY TO BUY MORE.: NEVER TRUE

## 2024-03-15 SDOH — ECONOMIC STABILITY: FOOD INSECURITY: WITHIN THE PAST 12 MONTHS, THE FOOD YOU BOUGHT JUST DIDN'T LAST AND YOU DIDN'T HAVE MONEY TO GET MORE.: NEVER TRUE

## 2024-03-15 ASSESSMENT — ENCOUNTER SYMPTOMS
ABDOMINAL DISTENTION: 0
PHOTOPHOBIA: 0
ABDOMINAL PAIN: 0
COUGH: 0
WHEEZING: 0
BACK PAIN: 0
CHEST TIGHTNESS: 0
RHINORRHEA: 0
DIARRHEA: 0
ALLERGIC/IMMUNOLOGIC NEGATIVE: 1
EYE PAIN: 0
SORE THROAT: 0
SHORTNESS OF BREATH: 0
VOMITING: 0
NAUSEA: 0

## 2024-03-15 ASSESSMENT — PATIENT HEALTH QUESTIONNAIRE - PHQ9
SUM OF ALL RESPONSES TO PHQ9 QUESTIONS 1 & 2: 0
SUM OF ALL RESPONSES TO PHQ QUESTIONS 1-9: 0
1. LITTLE INTEREST OR PLEASURE IN DOING THINGS: 0
SUM OF ALL RESPONSES TO PHQ QUESTIONS 1-9: 0
SUM OF ALL RESPONSES TO PHQ QUESTIONS 1-9: 0
2. FEELING DOWN, DEPRESSED OR HOPELESS: 0
SUM OF ALL RESPONSES TO PHQ QUESTIONS 1-9: 0

## 2024-03-16 LAB
25(OH)D3+25(OH)D2 SERPL-MCNC: 29.3 NG/ML (ref 30–100)
ALBUMIN SERPL-MCNC: 5.2 G/DL (ref 4.3–5.2)
ALBUMIN/GLOB SERPL: 1.9 {RATIO} (ref 1.2–2.2)
ALP SERPL-CCNC: 64 IU/L (ref 44–121)
ALT SERPL-CCNC: 19 IU/L (ref 0–44)
AST SERPL-CCNC: 22 IU/L (ref 0–40)
BILIRUB SERPL-MCNC: 1.2 MG/DL (ref 0–1.2)
BUN SERPL-MCNC: 16 MG/DL (ref 6–20)
BUN/CREAT SERPL: 19 (ref 9–20)
CALCIUM SERPL-MCNC: 10.2 MG/DL (ref 8.7–10.2)
CHLORIDE SERPL-SCNC: 103 MMOL/L (ref 96–106)
CHOLEST SERPL-MCNC: 161 MG/DL (ref 100–199)
CO2 SERPL-SCNC: 26 MMOL/L (ref 20–29)
CREAT SERPL-MCNC: 0.83 MG/DL (ref 0.76–1.27)
EGFRCR SERPLBLD CKD-EPI 2021: 122 ML/MIN/1.73
ERYTHROCYTE [DISTWIDTH] IN BLOOD BY AUTOMATED COUNT: 12.4 % (ref 11.6–15.4)
GLOBULIN SER CALC-MCNC: 2.7 G/DL (ref 1.5–4.5)
GLUCOSE SERPL-MCNC: 90 MG/DL (ref 70–99)
HBA1C MFR BLD: 5.7 % (ref 4.8–5.6)
HCT VFR BLD AUTO: 48.9 % (ref 37.5–51)
HDLC SERPL-MCNC: 57 MG/DL
HGB BLD-MCNC: 15.9 G/DL (ref 13–17.7)
HIV 1+2 AB+HIV1 P24 AG SERPL QL IA: NON REACTIVE
LDLC SERPL CALC-MCNC: 91 MG/DL (ref 0–99)
MCH RBC QN AUTO: 29.9 PG (ref 26.6–33)
MCHC RBC AUTO-ENTMCNC: 32.5 G/DL (ref 31.5–35.7)
MCV RBC AUTO: 92 FL (ref 79–97)
PLATELET # BLD AUTO: 359 X10E3/UL (ref 150–450)
POTASSIUM SERPL-SCNC: 4.9 MMOL/L (ref 3.5–5.2)
PROT SERPL-MCNC: 7.9 G/DL (ref 6–8.5)
RBC # BLD AUTO: 5.31 X10E6/UL (ref 4.14–5.8)
SODIUM SERPL-SCNC: 143 MMOL/L (ref 134–144)
TRIGL SERPL-MCNC: 64 MG/DL (ref 0–149)
VLDLC SERPL CALC-MCNC: 13 MG/DL (ref 5–40)
WBC # BLD AUTO: 7.1 X10E3/UL (ref 3.4–10.8)

## 2024-03-18 LAB
HSV1 IGG SER IA-ACNC: <0.91 INDEX (ref 0–0.9)
HSV2 IGG SER IA-ACNC: <0.91 INDEX (ref 0–0.9)

## 2024-09-12 ENCOUNTER — PATIENT MESSAGE (OUTPATIENT)
Facility: CLINIC | Age: 29
End: 2024-09-12

## 2025-03-21 ENCOUNTER — OFFICE VISIT (OUTPATIENT)
Facility: CLINIC | Age: 30
End: 2025-03-21
Payer: COMMERCIAL

## 2025-03-21 VITALS
DIASTOLIC BLOOD PRESSURE: 72 MMHG | TEMPERATURE: 98.3 F | SYSTOLIC BLOOD PRESSURE: 116 MMHG | RESPIRATION RATE: 16 BRPM | OXYGEN SATURATION: 99 % | HEIGHT: 67 IN | BODY MASS INDEX: 19.46 KG/M2 | HEART RATE: 82 BPM | WEIGHT: 124 LBS

## 2025-03-21 DIAGNOSIS — Z00.00 ENCOUNTER FOR WELLNESS EXAMINATION IN ADULT: Primary | ICD-10-CM

## 2025-03-21 DIAGNOSIS — Z00.00 ENCOUNTER FOR WELLNESS EXAMINATION IN ADULT: ICD-10-CM

## 2025-03-21 PROCEDURE — 99395 PREV VISIT EST AGE 18-39: CPT

## 2025-03-21 SDOH — ECONOMIC STABILITY: FOOD INSECURITY: WITHIN THE PAST 12 MONTHS, THE FOOD YOU BOUGHT JUST DIDN'T LAST AND YOU DIDN'T HAVE MONEY TO GET MORE.: NEVER TRUE

## 2025-03-21 SDOH — ECONOMIC STABILITY: FOOD INSECURITY: WITHIN THE PAST 12 MONTHS, YOU WORRIED THAT YOUR FOOD WOULD RUN OUT BEFORE YOU GOT MONEY TO BUY MORE.: NEVER TRUE

## 2025-03-21 ASSESSMENT — PATIENT HEALTH QUESTIONNAIRE - PHQ9
1. LITTLE INTEREST OR PLEASURE IN DOING THINGS: NOT AT ALL
SUM OF ALL RESPONSES TO PHQ QUESTIONS 1-9: 0
2. FEELING DOWN, DEPRESSED OR HOPELESS: NOT AT ALL
SUM OF ALL RESPONSES TO PHQ QUESTIONS 1-9: 0

## 2025-03-21 ASSESSMENT — ENCOUNTER SYMPTOMS
WHEEZING: 0
CHEST TIGHTNESS: 0
NAUSEA: 0
PHOTOPHOBIA: 0
SHORTNESS OF BREATH: 0
ABDOMINAL DISTENTION: 0
ABDOMINAL PAIN: 0
VOMITING: 0
DIARRHEA: 0
COUGH: 0

## 2025-03-21 NOTE — PROGRESS NOTES
\"Have you been to the ER, urgent care clinic since your last visit?  Hospitalized since your last visit?\"    NO    “Have you seen or consulted any other health care providers outside our system since your last visit?”    NO    Chief Complaint   Patient presents with    Annual Exam     /72 (BP Site: Left Upper Arm, Patient Position: Sitting, BP Cuff Size: Medium Adult)   Pulse 82   Temp 98.3 °F (36.8 °C) (Skin)   Resp 16   Ht 1.702 m (5' 7\")   Wt 56.2 kg (124 lb)   SpO2 99%   BMI 19.42 kg/m²

## 2025-03-21 NOTE — PROGRESS NOTES
Dada Gu  29 y.o. male  1995  [address] [place] [address]  [id number]     [facility]: Progress Note       Encounter Date: 3/21/2025    Patient presents with the following chief complaint(s)    Chief Complaint   Patient presents with    Annual Exam        History provided by patient    Assessment and Plan:   1. Encounter for wellness examination in adult  -     Vitamin D 25 Hydroxy; Future  -     Hemoglobin A1C; Future  -     Lipid Panel; Future  -     CBC; Future  -     Comprehensive Metabolic Panel; Future  -     Thyroid Cascade Profile; Future       A complete physical was completed on the patient at today's visit. Recommend eye exam appointments every year and dental appointments every 6 months.    Discussed all lab work orders with patient. Patient to obtain lab work fasting 12 hours. Will review once received.     Return in about 3 months (around 6/21/2025) for HA,labs,mood.  History of Present Illness   Dada Gu is a 29 y.o. male with past medical history listed, who presents to clinic today as for a routine wellness exam today.   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 Says he has headaches, hx of skull fracture with meal plate. Using tylenol & Excedrin PRN  Diet: Generally unhealthy, about 1-2 bottles per day.   Activity:No regular physical activity  Tobacco/Vaping: Cigarettes about 11-2/day, mostly vaping nicotine  Alcohol: Drinking beer about 1 times per week  Drug Use: none     Last dental appt: seen every 6 months  Last eye exam:  > 5 years  Colonoscopy: No prior colonoscopy    Health Maintenance  Health Maintenance Due   Topic Date Due    Varicella vaccine (1 of 2 - 13+ 2-dose series) Never done    Hepatitis B vaccine (1 of 3 - 19+ 3-dose series) Never done    DTaP/Tdap/Td vaccine (1 - Tdap) Never done    Pneumococcal 0-49 years Vaccine (1 of 2 - PCV) Never done    A1C test (Diabetic or Prediabetic)  03/15/2025       Vitals:     Vitals: